# Patient Record
Sex: MALE | Race: ASIAN | HISPANIC OR LATINO | URBAN - METROPOLITAN AREA
[De-identification: names, ages, dates, MRNs, and addresses within clinical notes are randomized per-mention and may not be internally consistent; named-entity substitution may affect disease eponyms.]

---

## 2018-07-14 ENCOUNTER — EMERGENCY (EMERGENCY)
Facility: HOSPITAL | Age: 73
LOS: 1 days | Discharge: HOME | End: 2018-07-14
Attending: EMERGENCY MEDICINE | Admitting: EMERGENCY MEDICINE
Payer: SELF-PAY

## 2018-07-14 VITALS
HEART RATE: 69 BPM | RESPIRATION RATE: 16 BRPM | SYSTOLIC BLOOD PRESSURE: 110 MMHG | TEMPERATURE: 98 F | OXYGEN SATURATION: 96 % | DIASTOLIC BLOOD PRESSURE: 67 MMHG

## 2018-07-14 DIAGNOSIS — S40.859A SUPERFICIAL FOREIGN BODY OF UNSPECIFIED UPPER ARM, INITIAL ENCOUNTER: Chronic | ICD-10-CM

## 2018-07-14 PROCEDURE — 72170 X-RAY EXAM OF PELVIS: CPT | Mod: 26

## 2018-07-14 PROCEDURE — 99283 EMERGENCY DEPT VISIT LOW MDM: CPT

## 2018-07-14 PROCEDURE — 72170 X-RAY EXAM OF PELVIS: CPT

## 2018-07-14 RX ORDER — IBUPROFEN 200 MG
600 TABLET ORAL ONCE
Qty: 0 | Refills: 0 | Status: COMPLETED | OUTPATIENT
Start: 2018-07-14 | End: 2018-07-14

## 2018-07-14 RX ORDER — ACETAMINOPHEN 500 MG
650 TABLET ORAL ONCE
Qty: 0 | Refills: 0 | Status: COMPLETED | OUTPATIENT
Start: 2018-07-14 | End: 2018-07-14

## 2018-07-14 RX ADMIN — Medication 650 MILLIGRAM(S): at 13:40

## 2018-07-14 RX ADMIN — Medication 600 MILLIGRAM(S): at 13:04

## 2018-07-14 NOTE — ED PROVIDER NOTE - ATTENDING CONTRIBUTION TO CARE
pt is a 73 male with no pmhx presents with r groin tend, from of hip, no trauma noted, no signs of hernia noted, plain films, analgesia,

## 2018-07-14 NOTE — ED PROVIDER NOTE - PLAN OF CARE
1. Take Tylenol 650 mg every 6-8 hours or Motrin 600 mg every 8 hours as need for pain  2. Call [phone] or [phone] to schedule an appointment with a primary care doctor
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
## 2018-07-14 NOTE — ED PROVIDER NOTE - PHYSICAL EXAMINATION
GENERAL: no acute distress; well-developed  HEAD:  Atraumatic, Normocephalic  EYES: PERRLA, conjunctiva and sclera clear  ENT: MMM; oropharynx clear  NECK: Supple, No JVD  CHEST/LUNG: Clear to auscultation bilaterally; No wheeze  HEART: Regular rate and rhythm; No murmurs, rubs, or gallops  ABDOMEN: Soft, Nontender, Nondistended; Bowel sounds present  GROIN: No testicular pain; no appreciable hernia  EXTREMITIES:  2+ Peripheral Pulses, No clubbing, cyanosis, or edema  PSYCH: AAOx3  NEUROLOGY: no focal motor or sensory deficits.   SKIN: well healed scar of right upper arm with no erythema or purulence.     Genital Exam Chaperoned by Dr. Barrios

## 2018-07-14 NOTE — ED PROVIDER NOTE - CARE PLAN
Principal Discharge DX:	Groin pain, right  Assessment and plan of treatment:	1. Take Tylenol 650 mg every 6-8 hours or Motrin 600 mg every 8 hours as need for pain  2. Call [phone] or [phone] to schedule an appointment with a primary care doctor

## 2018-07-14 NOTE — ED PROVIDER NOTE - OBJECTIVE STATEMENT
72 y/o M no significant PMH presents with right groin pain.    Patient states that pain began 1 month prior with no inciting trauma/fall. He notes over the past week it has been increasing but with intermittent intensity. Non-radiating. Patient denies nausea/vomiting, testicular pain. Having normal bowel movements. Denies any fevers/chills. Ambulated to ED. No significant abdominal surgical history.
